# Patient Record
Sex: MALE | Race: WHITE | NOT HISPANIC OR LATINO | URBAN - NONMETROPOLITAN AREA
[De-identification: names, ages, dates, MRNs, and addresses within clinical notes are randomized per-mention and may not be internally consistent; named-entity substitution may affect disease eponyms.]

---

## 2018-03-01 ENCOUNTER — OFFICE VISIT (OUTPATIENT)
Dept: UROLOGY | Facility: CLINIC | Age: 83
End: 2018-03-01

## 2018-03-01 DIAGNOSIS — N40.1 URINARY RETENTION DUE TO BENIGN PROSTATIC HYPERPLASIA: Primary | ICD-10-CM

## 2018-03-01 DIAGNOSIS — R33.8 URINARY RETENTION DUE TO BENIGN PROSTATIC HYPERPLASIA: Primary | ICD-10-CM

## 2018-03-01 PROCEDURE — 99203 OFFICE O/P NEW LOW 30 MIN: CPT | Performed by: UROLOGY

## 2018-03-01 RX ORDER — CEFDINIR 300 MG/1
CAPSULE ORAL
COMMUNITY
Start: 2018-01-23

## 2018-03-01 RX ORDER — QUETIAPINE FUMARATE 25 MG/1
TABLET, FILM COATED ORAL
COMMUNITY
Start: 2018-02-10

## 2018-03-01 RX ORDER — MEMANTINE HYDROCHLORIDE 5 MG/1
TABLET ORAL
COMMUNITY
Start: 2018-02-12

## 2018-03-01 RX ORDER — ALLOPURINOL 300 MG/1
TABLET ORAL
COMMUNITY
Start: 2018-02-15

## 2018-03-01 RX ORDER — FUROSEMIDE 20 MG/1
TABLET ORAL
COMMUNITY
Start: 2018-02-23

## 2018-03-01 RX ORDER — DIVALPROEX SODIUM 250 MG/1
TABLET, EXTENDED RELEASE ORAL
COMMUNITY
Start: 2018-02-26

## 2018-03-01 RX ORDER — APIXABAN 2.5 MG/1
TABLET, FILM COATED ORAL
COMMUNITY
Start: 2018-02-12

## 2018-03-01 RX ORDER — CARVEDILOL 3.12 MG/1
TABLET ORAL
COMMUNITY
Start: 2018-02-26

## 2018-03-01 RX ORDER — OMEPRAZOLE 20 MG/1
CAPSULE, DELAYED RELEASE ORAL
COMMUNITY
Start: 2018-02-26

## 2018-03-01 RX ORDER — SERTRALINE HCL 25 MG
TABLET ORAL
COMMUNITY
Start: 2018-02-24

## 2018-03-01 RX ORDER — ALPRAZOLAM 0.5 MG/1
TABLET ORAL
COMMUNITY
Start: 2017-12-12

## 2018-03-01 RX ORDER — TAMSULOSIN HYDROCHLORIDE 0.4 MG/1
CAPSULE ORAL
COMMUNITY
Start: 2018-02-12

## 2018-03-01 RX ORDER — ALENDRONATE SODIUM 70 MG/1
TABLET ORAL
COMMUNITY
Start: 2018-01-23

## 2018-03-01 NOTE — PROGRESS NOTES
Chief Complaint:          Chief Complaint   Patient presents with   • Benign Prostatic Hypertrophy       HPI:   89 y.o. male.  89-year-old white male with multiple medical problems and severe dementia who is currently incarcerated in the nursing home pulled out his catheter 2 weeks ago had blade discharge she was transferred to the [facility] where he had a workup including a cystoscopy and a CT that was reportedly all okay.  He is on blood thinner.  He has numerous medical problems including DVT, osteoporosis, chronic kidney disease, severe Alzheimer's.  His catheters in good position currently I gave him reassurance and will see him back here on an as needed basis.    Past Medical History:        Past Medical History:   Diagnosis Date   • BPH with urinary obstruction    • Coronary artery disease    • Dementia    • Hypertension          Current Meds:     Current Outpatient Prescriptions   Medication Sig Dispense Refill   • alendronate (FOSAMAX) 70 MG tablet      • allopurinol (ZYLOPRIM) 300 MG tablet      • ALPRAZolam (XANAX) 0.5 MG tablet      • carvedilol (COREG) 3.125 MG tablet      • cefdinir (OMNICEF) 300 MG capsule      • divalproex (DEPAKOTE) 250 MG 24 hr tablet      • ELIQUIS 2.5 MG tablet tablet      • furosemide (LASIX) 20 MG tablet      • memantine (NAMENDA) 5 MG tablet      • omeprazole (priLOSEC) 20 MG capsule      • QUEtiapine (SEROquel) 25 MG tablet      • sertraline (ZOLOFT) 50 MG tablet      • tamsulosin (FLOMAX) 0.4 MG capsule 24 hr capsule      • ZOLOFT 25 MG tablet        No current facility-administered medications for this visit.         Allergies:      No Known Allergies     Past Surgical History:     History reviewed. No pertinent surgical history.      Social History:     Social History     Social History   • Marital status: Unknown     Spouse name: N/A   • Number of children: N/A   • Years of education: N/A     Occupational History   • Not on file.     Social History Main  Topics   • Smoking status: Never Smoker   • Smokeless tobacco: Not on file   • Alcohol use No   • Drug use: No   • Sexual activity: Not on file     Other Topics Concern   • Not on file     Social History Narrative   • No narrative on file       Family History:     Family History   Problem Relation Age of Onset   • No Known Problems Father    • No Known Problems Mother        Review of Systems:     Review of Systems   Unable to perform ROS: Dementia       Physical Exam:     Physical Exam   Constitutional:   Ill white male with dementia catheter to gravity drainage uncircumcised normal phallus       I have reviewed the following portions of the patient's history: allergies, current medications, past family history, past medical history, past social history, past surgical history, problem list and ROS and confirm it's accurate.    Procedure:       Assessment/Plan:   Urinary retention with [place] Catheter currently in good position           This document has been electronically signed by TIFFANY LAZARO MD March 1, 2018 10:20 AM

## 2019-01-03 ENCOUNTER — OUTSIDE FACILITY SERVICE (OUTPATIENT)
Dept: PSYCHIATRY | Facility: CLINIC | Age: 84
End: 2019-01-03

## 2019-01-03 PROCEDURE — 99232 SBSQ HOSP IP/OBS MODERATE 35: CPT | Performed by: PSYCHIATRY & NEUROLOGY
